# Patient Record
Sex: FEMALE | Race: WHITE | NOT HISPANIC OR LATINO | Employment: FULL TIME | URBAN - METROPOLITAN AREA
[De-identification: names, ages, dates, MRNs, and addresses within clinical notes are randomized per-mention and may not be internally consistent; named-entity substitution may affect disease eponyms.]

---

## 2019-07-17 ENCOUNTER — OFFICE VISIT (OUTPATIENT)
Dept: UROLOGY | Facility: CLINIC | Age: 59
End: 2019-07-17
Payer: COMMERCIAL

## 2019-07-17 VITALS — HEIGHT: 67 IN | BODY MASS INDEX: 30.61 KG/M2 | WEIGHT: 195 LBS | OXYGEN SATURATION: 96 % | HEART RATE: 80 BPM

## 2019-07-17 DIAGNOSIS — R31.29 MICROHEMATURIA: Primary | ICD-10-CM

## 2019-07-17 LAB
ALBUMIN UR-MCNC: NEGATIVE MG/DL
APPEARANCE UR: CLEAR
BILIRUB UR QL STRIP: NEGATIVE
COLOR UR AUTO: YELLOW
GLUCOSE UR STRIP-MCNC: NEGATIVE MG/DL
HGB UR QL STRIP: ABNORMAL
KETONES UR STRIP-MCNC: NEGATIVE MG/DL
LEUKOCYTE ESTERASE UR QL STRIP: ABNORMAL
NITRATE UR QL: NEGATIVE
PH UR STRIP: 7 PH (ref 5–7)
SOURCE: ABNORMAL
SP GR UR STRIP: 1.01 (ref 1–1.03)
UROBILINOGEN UR STRIP-ACNC: 0.2 EU/DL (ref 0.2–1)

## 2019-07-17 PROCEDURE — 88112 CYTOPATH CELL ENHANCE TECH: CPT | Performed by: PHYSICIAN ASSISTANT

## 2019-07-17 PROCEDURE — 87086 URINE CULTURE/COLONY COUNT: CPT | Performed by: PHYSICIAN ASSISTANT

## 2019-07-17 PROCEDURE — 99243 OFF/OP CNSLTJ NEW/EST LOW 30: CPT | Performed by: PHYSICIAN ASSISTANT

## 2019-07-17 PROCEDURE — 87186 SC STD MICRODIL/AGAR DIL: CPT | Performed by: PHYSICIAN ASSISTANT

## 2019-07-17 PROCEDURE — 81003 URINALYSIS AUTO W/O SCOPE: CPT | Mod: QW | Performed by: PHYSICIAN ASSISTANT

## 2019-07-17 PROCEDURE — 87088 URINE BACTERIA CULTURE: CPT | Performed by: PHYSICIAN ASSISTANT

## 2019-07-17 RX ORDER — INFLIXIMAB 100 MG/10ML
INJECTION, POWDER, LYOPHILIZED, FOR SOLUTION INTRAVENOUS ONCE
COMMUNITY
End: 2021-07-16

## 2019-07-17 RX ORDER — IBUPROFEN 200 MG
400 TABLET ORAL PRN
COMMUNITY
Start: 2018-02-19

## 2019-07-17 RX ORDER — PREDNISONE 5 MG/1
5 TABLET ORAL DAILY
Refills: 0 | COMMUNITY
Start: 2019-07-08 | End: 2021-07-16

## 2019-07-17 RX ORDER — MERCAPTOPURINE 50 MG/1
50 TABLET ORAL 2 TIMES DAILY
COMMUNITY
Start: 2019-07-16

## 2019-07-17 ASSESSMENT — PAIN SCALES - GENERAL: PAINLEVEL: NO PAIN (0)

## 2019-07-17 ASSESSMENT — MIFFLIN-ST. JEOR: SCORE: 1487.14

## 2019-07-17 NOTE — NURSING NOTE
Pt stated infusion 2 fridays ago.  Pt denies gross hem.  Pt PCP is chuy draper at [facility].  Some [facility] recs in care everywhere.  GABINO Bergman CMA

## 2019-07-17 NOTE — LETTER
2019       RE: Sandy Childers  [address] [id number]     Dear Colleague,    Thank you for referring your patient, Sandy Childers, to the [facility] [place] [facility]. Please see a copy of my visit note below.    CC: Hematuria.    HPI: It is a pleasure to see Ms. Sandy Childers, a 59 year old female, asked to be seen in consultation by Dr. Mikki Lee for evaluation of micro hematuria. Two UAs in  noted 3-5 RBCs/HPF.  Went away for a few years and now noted at annual exam (11-25 RBCs/HPF).  Saw a different urologist 5+ years ago when there was 3-5 RBCs/HPF and does not recall much of the evaluation.     The patient denies any gross hematuria.  Ms. Childers voids without difficulty, and reports nocturia of 0-1.  She currently denies any dysuria, pyuria, hesitancy, intermittency, feelings of incomplete emptying, or any recent history of urinary tract infections or stones.  Some urgency and leakage at times.     Ulcerative colitis.    Hematuria Risk Factors:  Age >40: Yes     Smoking history: never  Occupational exposure to chemicals or dyes (ie, benzenes, aromatic amines): no  History of urologic disorder or disease: no  History of irritative voiding symptoms: no  History of urinary tract infection: no  Analgesic abuse: no  History of pelvic irradiation: no    PMH:  UC  Hearing loss    SURG:     Hip arthroscopy with labral repair    SOC: Life long nonsmoker.    Current Outpatient Medications   Medication Sig Dispense Refill     ibuprofen (ADVIL) 200 MG tablet Take 400 mg by mouth as needed       inFLIXimab (REMICADE) 100 MG injection once       mercaptopurine (PURINETHOL) 50 MG tablet CHEMO Take 50 mg by mouth 2 times daily       predniSONE (DELTASONE) 5 MG tablet Take 5 mg by mouth daily  0     Allergies   Allergen Reactions     Sulfa Drugs Rash     And fever     FAMILY HISTORY: Father had prostate cancer.   "There is no history of urolithiasis.      ROS: A comprehensive 14 point ROS was obtained and negative except for that outlined above in the HPI.    PHYSICAL EXAM:   Pulse 80   Ht 1.702 m (5' 7\")   Wt 88.5 kg (195 lb)   SpO2 96%   BMI 30.54 kg/m       GEN: NAD  EYES: EOMI  MOUTH: MMM  NECK: Supple  RESP: Unlabored breathing  CARDIAC: No LE edema  SKIN: Warm  ABD: soft  NEURO: AAO    IMAGING: none    ASSESSMENT and PLAN:    59 year old female with micro hematuria.  The differential diagnosis at this point includes stone disease, infection, vaginal contaminant, urothelial malignancy, renal disorder versus another yet unknown diagnosis.    At this time, recommend proceeding with comprehensive hematuria evaluation to include:  - Urinalysis and micro.   - Urine cytology to look for cells concerning for malignancy.  - CT urogram for upper tract imaging.  - Cystoscopy with the first available urologist to evaluate the interior of the bladder. Follow up for hematuria as recommended by urologist performing cystoscopic evaluation.      -Discussed a trial of estrogen cream for urgency. She will think about this.     Thank you for allowing me to participate in Ms. Childers's care. I will keep you updated of her progress, but please do not hesitate to contact me with any questions.    Malika Villanueva PA-C  [facility]  30 minutes were spent with the patient today, > 50% in counseling and coordination of care of hematuria.          "

## 2019-07-17 NOTE — PROGRESS NOTES
"CC: Hematuria.    HPI: It is a pleasure to see Ms. Sandy Childers, a 59 year old female, asked to be seen in consultation by Dr. Mikki Lee for evaluation of micro hematuria. Two UAs in  noted 3-5 RBCs/HPF.  Went away for a few years and now noted at annual exam (11-25 RBCs/HPF).  Saw a different urologist 5+ years ago when there was 3-5 RBCs/HPF and does not recall much of the evaluation.     The patient denies any gross hematuria.  Ms. Childers voids without difficulty, and reports nocturia of 0-1.  She currently denies any dysuria, pyuria, hesitancy, intermittency, feelings of incomplete emptying, or any recent history of urinary tract infections or stones.  Some urgency and leakage at times.     Ulcerative colitis.    Hematuria Risk Factors:  Age >40: Yes     Smoking history: never  Occupational exposure to chemicals or dyes (ie, benzenes, aromatic amines): no  History of urologic disorder or disease: no  History of irritative voiding symptoms: no  History of urinary tract infection: no  Analgesic abuse: no  History of pelvic irradiation: no    PMH:  UC  Hearing loss    SURG:     Hip arthroscopy with labral repair    SOC: Life long nonsmoker.    Current Outpatient Medications   Medication Sig Dispense Refill     ibuprofen (ADVIL) 200 MG tablet Take 400 mg by mouth as needed       inFLIXimab (REMICADE) 100 MG injection once       mercaptopurine (PURINETHOL) 50 MG tablet CHEMO Take 50 mg by mouth 2 times daily       predniSONE (DELTASONE) 5 MG tablet Take 5 mg by mouth daily  0     Allergies   Allergen Reactions     Sulfa Drugs Rash     And fever     FAMILY HISTORY: Father had prostate cancer.  There is no history of urolithiasis.      ROS: A comprehensive 14 point ROS was obtained and negative except for that outlined above in the HPI.    PHYSICAL EXAM:   Pulse 80   Ht 1.702 m (5' 7\")   Wt 88.5 kg (195 lb)   SpO2 96%   BMI 30.54 kg/m      GEN: NAD  EYES: EOMI  MOUTH: MMM  NECK: Supple  RESP: " Unlabored breathing  CARDIAC: No LE edema  SKIN: Warm  ABD: soft  NEURO: AAO    IMAGING: none    ASSESSMENT and PLAN:    59 year old female with micro hematuria.  The differential diagnosis at this point includes stone disease, infection, vaginal contaminant, urothelial malignancy, renal disorder versus another yet unknown diagnosis.    At this time, recommend proceeding with comprehensive hematuria evaluation to include:  - Urinalysis and micro.   - Urine cytology to look for cells concerning for malignancy.  - CT urogram for upper tract imaging.  - Cystoscopy with the first available urologist to evaluate the interior of the bladder. Follow up for hematuria as recommended by urologist performing cystoscopic evaluation.      -Discussed a trial of estrogen cream for urgency. She will think about this.     Thank you for allowing me to participate in Ms. Childers's care. I will keep you updated of her progress, but please do not hesitate to contact me with any questions.    Malika Villanueva PA-C  [facility]  30 minutes were spent with the patient today, > 50% in counseling and coordination of care of hematuria.

## 2019-07-17 NOTE — PATIENT INSTRUCTIONS
-Urinalysis and micro analysis.   -Urine culture  - Urine cytology to look for abnormal cells.  - CT scan  - Cystoscopy with the  urologist to evaluate the interior of the bladder. Follow up as recommended by the urologist.

## 2019-07-19 DIAGNOSIS — N39.0 URINARY TRACT INFECTION: Primary | ICD-10-CM

## 2019-07-19 LAB
BACTERIA SPEC CULT: ABNORMAL
COPATH REPORT: NORMAL
Lab: ABNORMAL
SPECIMEN SOURCE: ABNORMAL

## 2019-07-19 RX ORDER — CIPROFLOXACIN 500 MG/1
500 TABLET, FILM COATED ORAL 2 TIMES DAILY
Qty: 14 TABLET | Refills: 0 | Status: SHIPPED | OUTPATIENT
Start: 2019-07-19 | End: 2019-07-26

## 2019-07-19 NOTE — RESULT ENCOUNTER NOTE
Called patient and informed her or results. ABT sent to patient's preferred pharmacy. Sarah Izaguirre LPN

## 2019-08-15 ENCOUNTER — HOSPITAL ENCOUNTER (OUTPATIENT)
Dept: CT IMAGING | Facility: CLINIC | Age: 59
Discharge: HOME | End: 2019-08-15
Attending: PHYSICIAN ASSISTANT | Admitting: PHYSICIAN ASSISTANT
Payer: COMMERCIAL

## 2019-08-15 ENCOUNTER — OFFICE VISIT (OUTPATIENT)
Dept: UROLOGY | Facility: CLINIC | Age: 59
End: 2019-08-15
Payer: COMMERCIAL

## 2019-08-15 VITALS — BODY MASS INDEX: 30.61 KG/M2 | OXYGEN SATURATION: 96 % | HEART RATE: 86 BPM | HEIGHT: 67 IN | WEIGHT: 195 LBS

## 2019-08-15 DIAGNOSIS — R31.29 MICROHEMATURIA: Primary | ICD-10-CM

## 2019-08-15 DIAGNOSIS — R31.29 MICROHEMATURIA: ICD-10-CM

## 2019-08-15 LAB
ALBUMIN UR-MCNC: NEGATIVE MG/DL
APPEARANCE UR: CLEAR
BILIRUB UR QL STRIP: NEGATIVE
COLOR UR AUTO: YELLOW
GLUCOSE UR STRIP-MCNC: NEGATIVE MG/DL
HGB UR QL STRIP: ABNORMAL
KETONES UR STRIP-MCNC: NEGATIVE MG/DL
LEUKOCYTE ESTERASE UR QL STRIP: NEGATIVE
NITRATE UR QL: NEGATIVE
PH UR STRIP: 7 PH (ref 5–7)
SOURCE: ABNORMAL
SP GR UR STRIP: 1.01 (ref 1–1.03)
UROBILINOGEN UR STRIP-ACNC: 0.2 EU/DL (ref 0.2–1)

## 2019-08-15 PROCEDURE — 25000125 ZZHC RX 250: Performed by: RADIOLOGY

## 2019-08-15 PROCEDURE — 25000128 H RX IP 250 OP 636: Performed by: PHYSICIAN ASSISTANT

## 2019-08-15 PROCEDURE — 74178 CT ABD&PLV WO CNTR FLWD CNTR: CPT

## 2019-08-15 PROCEDURE — 99213 OFFICE O/P EST LOW 20 MIN: CPT | Mod: 25 | Performed by: UROLOGY

## 2019-08-15 PROCEDURE — 81003 URINALYSIS AUTO W/O SCOPE: CPT | Performed by: UROLOGY

## 2019-08-15 PROCEDURE — 52000 CYSTOURETHROSCOPY: CPT | Performed by: UROLOGY

## 2019-08-15 RX ORDER — IOPAMIDOL 755 MG/ML
500 INJECTION, SOLUTION INTRAVASCULAR ONCE
Status: DISCONTINUED | OUTPATIENT
Start: 2019-08-15 | End: 2019-08-15 | Stop reason: CLARIF

## 2019-08-15 RX ORDER — LIDOCAINE HYDROCHLORIDE 20 MG/ML
JELLY TOPICAL ONCE
Status: DISCONTINUED | OUTPATIENT
Start: 2019-08-15 | End: 2021-07-16

## 2019-08-15 RX ORDER — IOPAMIDOL 755 MG/ML
100 INJECTION, SOLUTION INTRAVASCULAR ONCE
Status: COMPLETED | OUTPATIENT
Start: 2019-08-15 | End: 2019-08-15

## 2019-08-15 RX ADMIN — IOPAMIDOL 100 ML: 755 INJECTION, SOLUTION INTRAVENOUS at 14:52

## 2019-08-15 RX ADMIN — SODIUM CHLORIDE 65 ML: 9 INJECTION, SOLUTION INTRAVENOUS at 14:52

## 2019-08-15 ASSESSMENT — PAIN SCALES - GENERAL: PAINLEVEL: NO PAIN (0)

## 2019-08-15 ASSESSMENT — MIFFLIN-ST. JEOR: SCORE: 1487.14

## 2019-08-15 NOTE — NURSING NOTE
Pt has swollen ankles.  Pt is taking drainage tone.  Prior to the start of the procedure and with procedural staff participation, I verbally confirmed the patient s identity using two indicators, relevant allergies, that the procedure was appropriate and matched the consent or emergent situation, and that the correct equipment/implants were available. Immediately prior to starting the procedure I conducted the Time Out with the procedural staff and re-confirmed the patient s name, procedure, and site/side. (The Joint Commission universal protocol was followed.)  Yes    Sedation (Moderate or Deep): None  Pt has signed the consent form stating that we will be doing a CYSTOSCOPY (with or without stent removal) today, and that it is the correct procedure. I verbally confirmed the patient s identity using two indicators, relevant allergies, and that the correct equipment was available. Post-op information given to the pt as needed at check-out. I have sent an appropriate antibiotic to the pharmacy in our building as recommended by the MD. GABINO Bergman CMA    5mL 2% lidocaine hydrochloride Urojet instilled into urethra.    NDC# [address]-23  Lot #: [id number]  Expiration Date:  3-21  GABINO Bergman CMA

## 2019-08-15 NOTE — PATIENT INSTRUCTIONS
"AFTER YOUR CYSTOSCOPY  ?  ?  You have just completed a cystoscopy, or \"cysto\", which allowed your physician to learn more about your bladder (or to remove a stent placed after surgery). We suggest that you continue to avoid caffeine, fruit juice, and alcohol for the next 24 hours, however, you are encouraged to return to your normal activities.  ?  ?  A few things that are considered normal after your cystoscopy:  ?  * small amount of bleeding (or spotting) that clears within the next 24 hours  ?  * slight burning sensation with urination  ?  * sensation of needing to void (urinate) more frequently  ?  * the feeling of \"air\" in your urine  ?  * mild discomfort that is relieved with Tylenol    * bladder spasms  ?  ?  ?  Please contact our office promptly if you:  ?  * develop a fever above 101 degrees  ?  * are unable to urinate  ?  * develop bright red blood that does not stop  ?  * experience severe pain or swelling  ?  ?  ?  And of course, please contact our office with any concerns or questions [phone]  ?      AFTER YOUR CYSTOSCOPY        You have just completed a cystoscopy, or \"cysto\", which allowed your physician to learn more about your bladder (or to remove a stent placed after surgery). We suggest that you continue to avoid caffeine, fruit juice, and alcohol for the next 24 hours, however, you are encouraged to return to your normal activities.         A few things that are considered normal after your cystoscopy:     * Small amount of bleeding (or spotting) that clears within the next 24 hours     * Slight burning sensation with urination     * Sensation to of needing to avoid more frequently     * The feeling of \"air\" in your urine     * Mild discomfort that is relieved with Tylenol        Please contact our office promptly if you:     * Develop a fever above 101 degrees     * Are unable to urinate     * Develop bright red blood that does not stop     * Severe pain or swelling         Please contact " our office with any concerns or questions @[facility].

## 2019-08-15 NOTE — LETTER
"8/15/2019      RE: Sandy Childers  [address] [facility] [address]         CHIEF COMPLAINT   It was my pleasure to see Sandy Childers who is a 59 year old female for follow-up of microscopic hematuria.    HPI   Sandy Childers is a very pleasant 59 year old female who presents with a history of microscopic hematuria. She has had this for some time and reportedly had negative workup more than 5 years ago. Recently had recurrence of asymptomatic microscopic hematuria. Cytology performed in clinic and negative. No gross hematuria.    CT Urogram 8/15/2019  IMPRESSION:   1. No CT findings to explain patient's symptoms of microscopic  hematuria, namely no radiodense kidney or ureteric stones. No  hydronephrosis in either kidney.  2. Significant hepatic steatosis.  3. Small hiatal hernia.    PHYSICAL EXAM  Patient is a 59 year old  female   Vitals: Pulse 86, height 1.702 m (5' 7\"), weight 88.5 kg (195 lb), SpO2 96 %.  General Appearance Adult: Body mass index is 30.54 kg/m .  Alert, no acute distress, oriented  HENT: throat/mouth:normal, good dentition  Lungs: no respiratory distress, or pursed lip breathing  Heart: No obvious jugular venous distension present  Abdomen: soft, nontender, no organomegaly or masses  Back: no CVAT  Musculoskeltal: extremities normal, no peripheral edema  Skin: no suspicious lesions or rashes  Neuro: Alert, oriented, speech and mentation normal  Psych: affect and mood normal  Gait: Normal    Cystoscopy Procedure 8/15/2019  Pre-procedure diagnosis:  Microscopic Hematuria  Post-procedure diagnosis: Normal cystoscopy  Procedure performed:  Cystourethroscopy  Surgeon:    Ed Morocho MD   Anesthesia:    Local    Description of procedure:   After fully informed, voluntary consent was obtained, the patient was brought into the procedure room, identified and placed in a dorsal lithotomy position on the cystoscopy table.  The vagina/introitus were prepped with betadine and draped in a sterile " fashion. Urojet lidocaine gel was introduced.  A 15F flexible cystoscope was inserted into the urethra, and the bladder and urethra were examined in a systematic manner.  The patient tolerated the procedure well and there were no complications.      Findings:  External exam revealed no cystocele and no rectocele.   Cystoscopy then showed the urethra to be normal in appearance with normal coaptation. The bladder itself was completely surveyed.  The ureteric orifices were normal in position and number and effluxing clear urine.  There was no trabeculation.  There were no neoplasms, stones, or diverticula identifed.      ASSESSMENT and PLAN  59 year old female with asymptomatic microscopic hematuria.   Upper tract Imaging is normal   Cystoscopy is normal  Cytology was not concerning for malignancy    The hematuria work up revealed no source of the hematuria.  Therefore according to AUA Best Practice Policy Recommendations, we would recommend the patient follow up with her primary care physician for annual UAs.  If these demonstrate no hematuria over the next two years, there is no need for further follow up.  If the hematuria is persistent only on a microscopic level, she should return for urologic evaluation in 3-5 years per AUA recommendations.  If gross hematuria is experienced, she should return for evaluation sooner.    If there is hypertension, or proteinuria, casts etc in addition to the hematuria, recommend a nephrology evaluation.    If gross hematuria returns in the absence of primary renal disease, would repeat the hematuria workup in 24 months.    I spent over 15 minutes with the patient.  Over half this time was spent on counseling regarding microscopic hematuria.    Ed Morocho MD  Urology  [facility]  Clinic Phone [phone]

## 2019-08-15 NOTE — PROGRESS NOTES
"  CHIEF COMPLAINT   It was my pleasure to see Sandy Childers who is a 59 year old female for follow-up of microscopic hematuria.    HPI   Sandy Childers is a very pleasant 59 year old female who presents with a history of microscopic hematuria. She has had this for some time and reportedly had negative workup more than 5 years ago. Recently had recurrence of asymptomatic microscopic hematuria. Cytology performed in clinic and negative. No gross hematuria.    CT Urogram 8/15/2019  IMPRESSION:   1. No CT findings to explain patient's symptoms of microscopic  hematuria, namely no radiodense kidney or ureteric stones. No  hydronephrosis in either kidney.  2. Significant hepatic steatosis.  3. Small hiatal hernia.    PHYSICAL EXAM  Patient is a 59 year old  female   Vitals: Pulse 86, height 1.702 m (5' 7\"), weight 88.5 kg (195 lb), SpO2 96 %.  General Appearance Adult: Body mass index is 30.54 kg/m .  Alert, no acute distress, oriented  HENT: throat/mouth:normal, good dentition  Lungs: no respiratory distress, or pursed lip breathing  Heart: No obvious jugular venous distension present  Abdomen: soft, nontender, no organomegaly or masses  Back: no CVAT  Musculoskeltal: extremities normal, no peripheral edema  Skin: no suspicious lesions or rashes  Neuro: Alert, oriented, speech and mentation normal  Psych: affect and mood normal  Gait: Normal    Cystoscopy Procedure 8/15/2019  Pre-procedure diagnosis:  Microscopic Hematuria  Post-procedure diagnosis: Normal cystoscopy  Procedure performed:  Cystourethroscopy  Surgeon:    Ed Morocho MD   Anesthesia:    Local    Description of procedure:   After fully informed, voluntary consent was obtained, the patient was brought into the procedure room, identified and placed in a dorsal lithotomy position on the cystoscopy table.  The vagina/introitus were prepped with betadine and draped in a sterile fashion. Urojet lidocaine gel was introduced.  A 15F flexible cystoscope was inserted " into the urethra, and the bladder and urethra were examined in a systematic manner.  The patient tolerated the procedure well and there were no complications.      Findings:  External exam revealed no cystocele and no rectocele.   Cystoscopy then showed the urethra to be normal in appearance with normal coaptation. The bladder itself was completely surveyed.  The ureteric orifices were normal in position and number and effluxing clear urine.  There was no trabeculation.  There were no neoplasms, stones, or diverticula identifed.      ASSESSMENT and PLAN  59 year old female with asymptomatic microscopic hematuria.   Upper tract Imaging is normal   Cystoscopy is normal  Cytology was not concerning for malignancy    The hematuria work up revealed no source of the hematuria.  Therefore according to AUA Best Practice Policy Recommendations, we would recommend the patient follow up with her primary care physician for annual UAs.  If these demonstrate no hematuria over the next two years, there is no need for further follow up.  If the hematuria is persistent only on a microscopic level, she should return for urologic evaluation in 3-5 years per AUA recommendations.  If gross hematuria is experienced, she should return for evaluation sooner.    If there is hypertension, or proteinuria, casts etc in addition to the hematuria, recommend a nephrology evaluation.    If gross hematuria returns in the absence of primary renal disease, would repeat the hematuria workup in 24 months.    I spent over 15 minutes with the patient.  Over half this time was spent on counseling regarding microscopic hematuria.    Ed Morocho MD  Urology  [facility]  Clinic Phone [phone]

## 2020-02-12 ENCOUNTER — ANCILLARY PROCEDURE (OUTPATIENT)
Dept: GENERAL RADIOLOGY | Facility: CLINIC | Age: 60
End: 2020-02-12
Attending: PHYSICIAN ASSISTANT
Payer: COMMERCIAL

## 2020-02-12 ENCOUNTER — OFFICE VISIT (OUTPATIENT)
Dept: URGENT CARE | Facility: URGENT CARE | Age: 60
End: 2020-02-12
Payer: COMMERCIAL

## 2020-02-12 VITALS
TEMPERATURE: 99.6 F | HEART RATE: 91 BPM | SYSTOLIC BLOOD PRESSURE: 104 MMHG | DIASTOLIC BLOOD PRESSURE: 58 MMHG | BODY MASS INDEX: 30.23 KG/M2 | RESPIRATION RATE: 16 BRPM | OXYGEN SATURATION: 95 % | WEIGHT: 193 LBS

## 2020-02-12 DIAGNOSIS — J10.1 INFLUENZA A: ICD-10-CM

## 2020-02-12 DIAGNOSIS — J10.1 INFLUENZA A: Primary | ICD-10-CM

## 2020-02-12 PROCEDURE — 99214 OFFICE O/P EST MOD 30 MIN: CPT | Performed by: PHYSICIAN ASSISTANT

## 2020-02-12 PROCEDURE — 71046 X-RAY EXAM CHEST 2 VIEWS: CPT

## 2020-02-12 RX ORDER — AZITHROMYCIN 250 MG/1
TABLET, FILM COATED ORAL
Qty: 6 TABLET | Refills: 0 | Status: SHIPPED | OUTPATIENT
Start: 2020-02-12 | End: 2021-07-16

## 2020-02-12 NOTE — PATIENT INSTRUCTIONS
Patient Education     Influenza (Adult)    Influenza is also called the flu. It is a viral illness that affects the air passages of your lungs. It is different from the common cold. The flu can easily be passed from one to person to another. It may be spread through the air by coughing and sneezing. Or it can be spread by touching the sick person and then touching your own eyes, nose, or mouth.  The flu starts 1 to 3 days after you are exposed to the flu virus. It may last for 1 to 2 weeks but many people feel tired or fatigued for many weeks afterward. You usually don t need to take antibiotics unless you have a complication. This might be an ear or sinus infection or pneumonia.  Symptoms of the flu may be mild or severe. They can include extreme tiredness (wanting to stay in bed all day), chills, fevers, muscle aches, soreness with eye movement, headache, and a dry, hacking cough.  Home care  Follow these guidelines when caring for yourself at home:    Avoid being around cigarette smoke, whether yours or other people s.    Acetaminophen or ibuprofen will help ease your fever, muscle aches, and headache. Don t give aspirin to anyone younger than 18 who has the flu. Aspirin can harm the liver.    Nausea and loss of appetite are common with the flu. Eat light meals. Drink 6 to 8 glasses of liquids every day. Good choices are water, sport drinks, soft drinks without caffeine, juices, tea, and soup. Extra fluids will also help loosen secretions in your nose and lungs.    Over-the-counter cold medicines will not make the flu go away faster. But the medicines may help with coughing, sore throat, and congestion in your nose and sinuses. Don t use a decongestant if you have high blood pressure.    Stay home until your fever has been gone for at least 24 hours without using medicine to reduce fever.  Follow-up care  Follow up with your healthcare provider, or as advised, if you are not getting better over the next  week.  If you are age 65 or older, talk with your provider about getting a pneumococcal vaccine every 5 years. You should also get this vaccine if you have chronic asthma or COPD. All adults should get a flu vaccine every fall. Ask your provider about this.  When to seek medical advice  Call your healthcare provider right away if any of these occur:    Cough with lots of colored mucus (sputum) or blood in your mucus    Chest pain, shortness of breath, wheezing, or trouble breathing    Severe headache, or face, neck, or ear pain    New rash with fever    Fever of 100.4 F (38 C) or higher, or as directed by your healthcare provider    Confusion, behavior change, or seizure    Severe weakness or dizziness    You get a new fever or cough after getting better for a few days  Date Last Reviewed: 1/1/2017 2000-2019 [employer]. [address] [place] [address], [place] [address]. All rights reserved. This information is not intended as a substitute for professional medical care. Always follow your healthcare professional's instructions.

## 2020-02-12 NOTE — PROGRESS NOTES
SUBJECTIVE:   Sandy Childers is a 59 year old female presenting with a chief complaint of fever, cough. Positive flu A.  Too late to treat.    Onset of symptoms was 5 day(s) ago.  Course of illness is same.    Severity moderate  Current and Associated symptoms: as above  Treatment measures tried include Tylenol/Ibuprofen, quils, muicinex, cough drops, vicks.  Predisposing factors include None.    Past Medical History:   Diagnosis Date     Mumps      Spider veins      Current Outpatient Medications   Medication Sig Dispense Refill     DM-Doxylamine-Acetaminophen (NYQUIL COLD & FLU PO)        ibuprofen (ADVIL) 200 MG tablet Take 400 mg by mouth as needed       inFLIXimab (REMICADE) 100 MG injection once       Lifitegrast (XIIDRA OP)        mercaptopurine (PURINETHOL) 50 MG tablet CHEMO Take 50 mg by mouth 2 times daily       Pseudoephedrine-DM-GG-APAP (DAYQUIL LIQUICAPS OR)        predniSONE (DELTASONE) 5 MG tablet Take 5 mg by mouth daily  0     WHEAT GERM OIL PO        Social History     Tobacco Use     Smoking status: Never Smoker     Smokeless tobacco: Never Used   Substance Use Topics     Alcohol use: Not on file     Family history noncontributory       Allergies   Allergen Reactions     Sulfa Drugs Rash     And fever         ROS:  10 point ROS negative except as listed above      OBJECTIVE:  /58   Pulse 91   Temp 99.6  F (37.6  C) (Tympanic)   Resp 16   Wt 87.5 kg (193 lb)   SpO2 95%   BMI 30.23 kg/m    GENERAL APPEARANCE: healthy, alert and no distress  EYES: EOMI,  PERRL, conjunctiva clear  HENT: ear canals and TM's normal.  Nose and mouth without ulcers, erythema or lesions  NECK: supple, nontender, no lymphadenopathy  RESP: lungs clear to auscultation - no rales, rhonchi or wheezes  CV: regular rates and rhythm, normal S1 S2, no murmur noted  NEURO: Normal strength and tone, sensory exam grossly normal,  normal speech and mentation  SKIN: no suspicious lesions or rashes    X-ray image initially  interpreted in clinic by me in order to rule out pneumonia.  No evidence appreciated.      ASSESSMENT:  (J10.1) Influenza A  (primary encounter diagnosis)  Comment: covering for secondary infection  Plan: XR Chest 2 Views, albuterol (PROAIR RESPICLICK)        108 (90 Base) MCG/ACT inhaler, azithromycin         (ZITHROMAX) 250 MG tablet        Red flags and emergent follow up discussed, and understood by patient  Follow up with PCP if symptoms worsen or fail to improve      Patient Instructions     Patient Education     Influenza (Adult)    Influenza is also called the flu. It is a viral illness that affects the air passages of your lungs. It is different from the common cold. The flu can easily be passed from one to person to another. It may be spread through the air by coughing and sneezing. Or it can be spread by touching the sick person and then touching your own eyes, nose, or mouth.  The flu starts 1 to 3 days after you are exposed to the flu virus. It may last for 1 to 2 weeks but many people feel tired or fatigued for many weeks afterward. You usually don t need to take antibiotics unless you have a complication. This might be an ear or sinus infection or pneumonia.  Symptoms of the flu may be mild or severe. They can include extreme tiredness (wanting to stay in bed all day), chills, fevers, muscle aches, soreness with eye movement, headache, and a dry, hacking cough.  Home care  Follow these guidelines when caring for yourself at home:    Avoid being around cigarette smoke, whether yours or other people s.    Acetaminophen or ibuprofen will help ease your fever, muscle aches, and headache. Don t give aspirin to anyone younger than 18 who has the flu. Aspirin can harm the liver.    Nausea and loss of appetite are common with the flu. Eat light meals. Drink 6 to 8 glasses of liquids every day. Good choices are water, sport drinks, soft drinks without caffeine, juices, tea, and soup. Extra fluids will also  help loosen secretions in your nose and lungs.    Over-the-counter cold medicines will not make the flu go away faster. But the medicines may help with coughing, sore throat, and congestion in your nose and sinuses. Don t use a decongestant if you have high blood pressure.    Stay home until your fever has been gone for at least 24 hours without using medicine to reduce fever.  Follow-up care  Follow up with your healthcare provider, or as advised, if you are not getting better over the next week.  If you are age 65 or older, talk with your provider about getting a pneumococcal vaccine every 5 years. You should also get this vaccine if you have chronic asthma or COPD. All adults should get a flu vaccine every fall. Ask your provider about this.  When to seek medical advice  Call your healthcare provider right away if any of these occur:    Cough with lots of colored mucus (sputum) or blood in your mucus    Chest pain, shortness of breath, wheezing, or trouble breathing    Severe headache, or face, neck, or ear pain    New rash with fever    Fever of 100.4 F (38 C) or higher, or as directed by your healthcare provider    Confusion, behavior change, or seizure    Severe weakness or dizziness    You get a new fever or cough after getting better for a few days  Date Last Reviewed: 1/1/2017 2000-2019 [employer]. [address] [place] [address], [place] [address]. All rights reserved. This information is not intended as a substitute for professional medical care. Always follow your healthcare professional's instructions.

## 2021-07-16 ENCOUNTER — OFFICE VISIT (OUTPATIENT)
Dept: PEDIATRICS | Facility: CLINIC | Age: 61
End: 2021-07-16
Payer: COMMERCIAL

## 2021-07-16 ENCOUNTER — TELEPHONE (OUTPATIENT)
Dept: UROLOGY | Facility: CLINIC | Age: 61
End: 2021-07-16

## 2021-07-16 VITALS
WEIGHT: 199.7 LBS | RESPIRATION RATE: 14 BRPM | DIASTOLIC BLOOD PRESSURE: 64 MMHG | TEMPERATURE: 98 F | OXYGEN SATURATION: 98 % | BODY MASS INDEX: 31.34 KG/M2 | SYSTOLIC BLOOD PRESSURE: 118 MMHG | HEIGHT: 67 IN | HEART RATE: 84 BPM

## 2021-07-16 DIAGNOSIS — N89.8 VAGINAL ODOR: ICD-10-CM

## 2021-07-16 DIAGNOSIS — R39.9 UTI SYMPTOMS: Primary | ICD-10-CM

## 2021-07-16 DIAGNOSIS — Z87.440 PERSONAL HISTORY OF URINARY TRACT INFECTION: ICD-10-CM

## 2021-07-16 DIAGNOSIS — Z87.448 HISTORY OF HEMATURIA: ICD-10-CM

## 2021-07-16 PROBLEM — K51.90 ULCERATIVE COLITIS (H): Status: ACTIVE | Noted: 2018-01-01

## 2021-07-16 LAB
ALBUMIN UR-MCNC: NEGATIVE MG/DL
APPEARANCE UR: CLEAR
BACTERIA #/AREA URNS HPF: ABNORMAL /HPF
BILIRUB UR QL STRIP: NEGATIVE
CLUE CELLS: ABNORMAL
COLOR UR AUTO: YELLOW
GLUCOSE UR STRIP-MCNC: NEGATIVE MG/DL
HGB UR QL STRIP: ABNORMAL
KETONES UR STRIP-MCNC: NEGATIVE MG/DL
LEUKOCYTE ESTERASE UR QL STRIP: NEGATIVE
NITRATE UR QL: NEGATIVE
PH UR STRIP: 6 [PH] (ref 5–7)
RBC #/AREA URNS AUTO: ABNORMAL /HPF
SP GR UR STRIP: 1.02 (ref 1–1.03)
SQUAMOUS #/AREA URNS AUTO: ABNORMAL /LPF
TRICHOMONAS, WET PREP: ABNORMAL
UROBILINOGEN UR STRIP-ACNC: 0.2 E.U./DL
WBC #/AREA URNS AUTO: ABNORMAL /HPF
WBC'S/HIGH POWER FIELD, WET PREP: ABNORMAL
YEAST, WET PREP: ABNORMAL

## 2021-07-16 PROCEDURE — 87086 URINE CULTURE/COLONY COUNT: CPT | Performed by: NURSE PRACTITIONER

## 2021-07-16 PROCEDURE — 81001 URINALYSIS AUTO W/SCOPE: CPT | Performed by: NURSE PRACTITIONER

## 2021-07-16 PROCEDURE — 87210 SMEAR WET MOUNT SALINE/INK: CPT | Performed by: NURSE PRACTITIONER

## 2021-07-16 PROCEDURE — 99213 OFFICE O/P EST LOW 20 MIN: CPT | Performed by: NURSE PRACTITIONER

## 2021-07-16 ASSESSMENT — MIFFLIN-ST. JEOR: SCORE: 1498.46

## 2021-07-16 NOTE — TELEPHONE ENCOUNTER
[facility] Call Center    Phone Message    May a detailed message be left on voicemail: yes     Reason for Call: Appointment Intake    Referring Provider Name: Snow Bernard, NP    Diagnosis and/or Symptoms: UTI symptoms [R39.9]  Personal history of urinary tract infection [Z87.440]  History of hematuria    Pt has seen Dr. Morocho in the past as well as Malika Villanueva.  I offered an appt. In [place] with both providers for current issues.  Pt declined and would like to go to [place] only.  Please reach out to Pt for scheduling.  Thank you.    Action Taken: Message routed to:  Other: ub uro    Travel Screening: Not Applicable

## 2021-07-16 NOTE — PROGRESS NOTES
"Assessment & Plan     UTI symptoms  Personal history of urinary tract infection  Vaginal odor  History of hematuria  Wet prep negative. UA shows blood, which is an ongoing problem for her (has seen urology in the past for this). Continues to have UTI like symptoms for the past 2 + mos, to see urology for further work-up based on her previous hx of hematuria.  - UA Macro with Reflex to Micro and Culture - lab collect; Future  - Wet prep - Clinic Collect  - UA Macro with Reflex to Micro and Culture - lab collect  - Urine Microscopic  - Urine Culture Aerobic Bacterial - lab collect; Future  - Adult Urology Referral; Future      There are no Patient Instructions on file for this visit.    Return in about 1 month (around 8/16/2021) for Physical Exam.    Snow Bernard NP  [facility] HELEN Delgado is a 61 year old who presents for the following health issues     History of Present Illness       She eats 2-3 servings of fruits and vegetables daily.She consumes 0 sweetened beverage(s) daily.She exercises with enough effort to increase her heart rate 30 to 60 minutes per day.  She exercises with enough effort to increase her heart rate 3 or less days per week.   She is taking medications regularly.       Genitourinary - Female  Was treated for UTI thru [facility] approx 6 weeks ago - after taking antibiotic still had symptoms - went in again and got another antibiotic    Onset/Duration: 6 weeks  Description:   Painful urination (Dysuria): \"burning pinching heaviness\"           Frequency: no  Blood in urine (Hematuria): YES  Delay in urine (Hesitency): no  Intensity: moderate  Progression of Symptoms:  worsening  Accompanying Signs & Symptoms:  Fever/chills: no  Flank pain: YES- lower back pain - but not sure if from this  Nausea and vomiting: YES- with colitis  Vaginal symptoms: odor  Abdominal/Pelvic Pain: no  History:   History of frequent UTI s: not frequent - but hx positive  History of " "kidney stones: no  Sexually Active: not currently  Possibility of pregnancy: No  Precipitating or alleviating factors: None  Therapies tried and outcome: Increase fluid intake and  antibiotics from [facility]     5/12: Macrobid  5/17: Cephalexin  Antibiotics didn't help her symptoms  Symptoms not worsening, but not improving  Some days better than other  Burning/fullness  Vaginal odor    Hx of hematuria, saw urology last 8/15/19 with recommendation for annual UA and follow-up with urology in 3-5 years    Review of Systems   Otherwise ROS is negative except as stated above.        Objective    /64 (BP Location: Right arm, Cuff Size: Adult Large)   Pulse 84   Temp 98  F (36.7  C) (Oral)   Resp 14   Ht 1.702 m (5' 7\")   Wt 90.6 kg (199 lb 11.2 oz)   SpO2 98%   BMI 31.28 kg/m    Body mass index is 31.28 kg/m .  Physical Exam   GENERAL: healthy, alert and no distress    No results found for this or any previous visit (from the past 24 hour(s)).            "

## 2021-07-17 LAB — BACTERIA UR CULT: NORMAL

## 2021-08-11 ENCOUNTER — OFFICE VISIT (OUTPATIENT)
Dept: UROLOGY | Facility: CLINIC | Age: 61
End: 2021-08-11
Payer: COMMERCIAL

## 2021-08-11 VITALS
HEIGHT: 67 IN | BODY MASS INDEX: 30.13 KG/M2 | WEIGHT: 192 LBS | SYSTOLIC BLOOD PRESSURE: 116 MMHG | DIASTOLIC BLOOD PRESSURE: 60 MMHG

## 2021-08-11 DIAGNOSIS — R30.0 DYSURIA: Primary | ICD-10-CM

## 2021-08-11 DIAGNOSIS — R19.00 PELVIC FULLNESS: ICD-10-CM

## 2021-08-11 DIAGNOSIS — N95.2 ATROPHIC VAGINITIS: ICD-10-CM

## 2021-08-11 DIAGNOSIS — R31.29 MICROHEMATURIA: ICD-10-CM

## 2021-08-11 LAB
ALBUMIN UR-MCNC: NEGATIVE MG/DL
APPEARANCE UR: CLEAR
BACTERIA #/AREA URNS HPF: ABNORMAL /HPF
BILIRUB UR QL STRIP: NEGATIVE
COLOR UR AUTO: YELLOW
GLUCOSE UR STRIP-MCNC: NEGATIVE MG/DL
HGB UR QL STRIP: ABNORMAL
KETONES UR STRIP-MCNC: 15 MG/DL
LEUKOCYTE ESTERASE UR QL STRIP: NEGATIVE
NITRATE UR QL: NEGATIVE
PH UR STRIP: 6 [PH] (ref 5–7)
RBC URINE: 1 /HPF
RESIDUAL VOLUME (RV) (EXTERNAL): 79
SP GR UR STRIP: 1.01 (ref 1–1.03)
UROBILINOGEN UR STRIP-ACNC: 0.2 E.U./DL
WBC URINE: 1 /HPF

## 2021-08-11 PROCEDURE — 99213 OFFICE O/P EST LOW 20 MIN: CPT | Mod: 25 | Performed by: PHYSICIAN ASSISTANT

## 2021-08-11 PROCEDURE — 81001 URINALYSIS AUTO W/SCOPE: CPT | Performed by: PHYSICIAN ASSISTANT

## 2021-08-11 PROCEDURE — 51798 US URINE CAPACITY MEASURE: CPT | Performed by: PHYSICIAN ASSISTANT

## 2021-08-11 PROCEDURE — 87109 MYCOPLASMA: CPT | Performed by: PHYSICIAN ASSISTANT

## 2021-08-11 RX ORDER — FLUTICASONE PROPIONATE 50 MCG
1 SPRAY, SUSPENSION (ML) NASAL DAILY
COMMUNITY

## 2021-08-11 RX ORDER — ESTRADIOL 0.1 MG/G
CREAM VAGINAL
Qty: 42.5 G | Refills: 3 | Status: SHIPPED | OUTPATIENT
Start: 2021-08-11

## 2021-08-11 ASSESSMENT — MIFFLIN-ST. JEOR: SCORE: 1463.54

## 2021-08-11 ASSESSMENT — PAIN SCALES - GENERAL: PAINLEVEL: NO PAIN (0)

## 2021-08-11 NOTE — NURSING NOTE
Chief Complaint   Patient presents with     UTI symptoms     Pt. is continuing to experience a pinching/burning, heavy sensation despite recent UTI treatment.    PVR: 79 mL    Brooklyn Dominguez, EMT

## 2021-08-11 NOTE — PATIENT INSTRUCTIONS
Start estrogen cream a pea sized amount by the urethra and vaginal introitus at bedtime three times a week (Monday, Wednesday, and Friday).  If >$40, let me know and we can get via a compound pharmacy.    Sent urine for microscopy, Ureaplasma and Mycoplasma cultures.  If positive cultures, would treat with culture specific antibiotics.  If microscopic blood increased, would consider repeat of CT Urogram and cystoscopy.    If estrogen doesn't help discomfort, would consider possible pelvic floor PT in the future.    Send me a message on [employer] or call in to update with symptoms on estrogen.  Also, I will follow up on your urine results and next recommendations.    Call [phone].

## 2021-08-11 NOTE — LETTER
2021       RE: Sandy Childers  [address]  [place] [address]     Dear Colleague,    Thank you for referring your patient, Sandy Childers, to the [facility] [place] [facility]. Please see a copy of my visit note below.    Subjective      CHIEF COMPLAINT/REASON FOR VISIT   Pelvic discomfort/fullness/pinching    HISTORY OF PRESENT ILLNESS   Ms. Childers is a pleasant 61-year-old, , female who feels a burning/pinching and heavy sensation in the pelvis despite treatment for urinary tract infections.  Patient denies symptoms of prolapse.  She does note that she gets nausea and vomiting with her colitis, but denies these currently.  She was treated for urinary tract infection on May 12 with Macrobid and May 17 with Keflex.  They did not improve her symptomatology.  Patient has been on several antibiotics and she is still had the symptoms for several months.  [facility] infection.     She has been previously seen for microscopic hematuria and had a normal cystoscopy in 2019 with Dr. Birmingham.  Urinalysis have not been convincing for infection.  Patient notes some occasional urge incontinence with rare urgency.  She does note some bowel issues.  She has been evaluated prior to the visit with Dr. Birmingham for microscopic hematuria.    They had previously discussed possible trial of topical estrogen to see if this would help her symptomatology.    She does note that she spends a lot of time in the bathroom due to her colitis.  She has had blood in her stool.    The following portions of the patient's history were reviewed and updated as appropriate: allergies, current medications, past family history, past medical history, past social history, past surgical history, and problem list.     REVIEW OF SYSTEMS   Review of Systems   Constitutional: Negative for chills and fever.  "  HENT: Positive for hearing loss.    Respiratory: Negative for shortness of breath.    Cardiovascular: Negative for chest pain.   Gastrointestinal: Negative for nausea and vomiting.   Genitourinary: Positive for pelvic pain. Negative for hematuria.      Per HPI.     Patient Active Problem List   Diagnosis     Ulcerative colitis (H)     Weight gain     Pre-diabetes     Hip pain, left     Hearing loss     Fructose malabsorption      Past Medical History:   Diagnosis Date     Mumps      Spider veins      Ulcerative colitis (H)       Objective      PHYSICAL EXAM   /60   Ht 1.702 m (5' 7\")   Wt 87.1 kg (192 lb)   BMI 30.07 kg/m     Physical Exam  Constitutional:       Appearance: Normal appearance.   HENT:      Head: Normocephalic.      Nose: Nose normal.   Pulmonary:      Effort: Pulmonary effort is normal.   Genitourinary:     Comments: Normal intact perineal sensation bilaterally.  Evidence of atrophic vaginal tissue.  Normal-appearing internal and external labia.  No palpable urethral masses.  Hypermobility of the urethra appreciated with small dribbling of urine with cough.  Anterior grade 1 prolapse, apical grade 1, rectal well supported.  No evidence of pelvic floor tension myalgia.  Musculoskeletal:      Cervical back: Normal range of motion.   Neurological:      General: No focal deficit present.      Mental Status: She is alert and oriented to person, place, and time.   Psychiatric:         Mood and Affect: Mood normal.         Behavior: Behavior normal.       LABORATORY     Recent Labs   Lab Test 08/11/21  1637 08/11/21  1632   COLOR  --  Yellow   APPEARANCE  --  Clear   URINEGLC  --  Negative   URINEBILI  --  Negative   URINEKETONE  --  15 *   SG  --  1.015   UBLD  --  Moderate*   URINEPH  --  6.0   PROTEIN  --  Negative   UROBILINOGEN  --  0.2   NITRITE  --  Negative   LEUKEST  --  Negative   RBCU 1  --    WBCU 1  --        TESTING  PVR: 79 ml.    Assessment & Plan    1. Dysuria    2. Pelvic " fullness    3. Microhematuria    4. Atrophic vaginitis      I had the pleasure today of meeting with Ms. Childers to discuss her urinary symptomatology.  She has been treated several times with antibiotics and her symptoms have persisted.    Start estrogen cream a pea sized amount by the urethra and vaginal introitus at bedtime three times a week (Monday, Wednesday, and Friday).  If >$40, let me know and we can get via a compound pharmacy.    Sent urine for microscopy, Ureaplasma and Mycoplasma cultures.  If positive cultures, would treat with culture specific antibiotics.  If microscopic blood increased, would consider repeat of CT Urogram and cystoscopy.    If estrogen doesn't help discomfort, would consider possible pelvic floor PT in the future.    Send me a message on [employer] or call in to update with symptoms on estrogen.  Also, I will follow up on your urine results and next recommendations.    Call [phone].    We briefly discussed pessary, but I do not think we are at a point where this would be required at this time.  This is something to consider in the future which will often help with prolapse and a sense of heaviness in the pelvis.    Signed by:       Leonor Thompson PA-C

## 2021-08-13 ENCOUNTER — TRANSFERRED RECORDS (OUTPATIENT)
Dept: HEALTH INFORMATION MANAGEMENT | Facility: CLINIC | Age: 61
End: 2021-08-13

## 2021-08-19 ENCOUNTER — OFFICE VISIT (OUTPATIENT)
Dept: PEDIATRICS | Facility: CLINIC | Age: 61
End: 2021-08-19
Payer: COMMERCIAL

## 2021-08-19 VITALS
HEART RATE: 83 BPM | WEIGHT: 193 LBS | OXYGEN SATURATION: 96 % | DIASTOLIC BLOOD PRESSURE: 57 MMHG | TEMPERATURE: 98.2 F | BODY MASS INDEX: 30.29 KG/M2 | HEIGHT: 67 IN | SYSTOLIC BLOOD PRESSURE: 106 MMHG

## 2021-08-19 DIAGNOSIS — H91.93 BILATERAL HEARING LOSS, UNSPECIFIED HEARING LOSS TYPE: ICD-10-CM

## 2021-08-19 DIAGNOSIS — R73.03 PRE-DIABETES: ICD-10-CM

## 2021-08-19 DIAGNOSIS — Z00.00 ROUTINE GENERAL MEDICAL EXAMINATION AT A HEALTH CARE FACILITY: Primary | ICD-10-CM

## 2021-08-19 DIAGNOSIS — G62.9 PERIPHERAL POLYNEUROPATHY: ICD-10-CM

## 2021-08-19 DIAGNOSIS — K51.90 ULCERATIVE COLITIS WITHOUT COMPLICATIONS, UNSPECIFIED LOCATION (H): ICD-10-CM

## 2021-08-19 PROBLEM — E74.10 FRUCTOSE MALABSORPTION: Status: ACTIVE | Noted: 2018-02-19

## 2021-08-19 PROBLEM — H91.90 HEARING LOSS: Status: ACTIVE | Noted: 2019-09-20

## 2021-08-19 PROBLEM — M25.552 HIP PAIN, LEFT: Status: ACTIVE | Noted: 2018-02-19

## 2021-08-19 PROBLEM — R63.5 WEIGHT GAIN: Status: ACTIVE | Noted: 2019-10-04

## 2021-08-19 LAB
ALBUMIN SERPL-MCNC: 3.8 G/DL (ref 3.4–5)
ALP SERPL-CCNC: 51 U/L (ref 40–150)
ALT SERPL W P-5'-P-CCNC: 28 U/L (ref 0–50)
ANION GAP SERPL CALCULATED.3IONS-SCNC: 4 MMOL/L (ref 3–14)
AST SERPL W P-5'-P-CCNC: 22 U/L (ref 0–45)
BILIRUB SERPL-MCNC: 0.4 MG/DL (ref 0.2–1.3)
BUN SERPL-MCNC: 14 MG/DL (ref 7–30)
CALCIUM SERPL-MCNC: 9.5 MG/DL (ref 8.5–10.1)
CHLORIDE BLD-SCNC: 106 MMOL/L (ref 94–109)
CHOLEST SERPL-MCNC: 169 MG/DL
CO2 SERPL-SCNC: 28 MMOL/L (ref 20–32)
CREAT SERPL-MCNC: 0.94 MG/DL (ref 0.52–1.04)
FASTING STATUS PATIENT QL REPORTED: YES
FOLATE SERPL-MCNC: 49.6 NG/ML
GFR SERPL CREATININE-BSD FRML MDRD: 65 ML/MIN/1.73M2
GLUCOSE BLD-MCNC: 100 MG/DL (ref 70–99)
HBA1C MFR BLD: 5.3 % (ref 0–5.6)
HDLC SERPL-MCNC: 48 MG/DL
LDLC SERPL CALC-MCNC: 96 MG/DL
NONHDLC SERPL-MCNC: 121 MG/DL
POTASSIUM BLD-SCNC: 4.3 MMOL/L (ref 3.4–5.3)
PROT SERPL-MCNC: 7.8 G/DL (ref 6.8–8.8)
SODIUM SERPL-SCNC: 138 MMOL/L (ref 133–144)
TRIGL SERPL-MCNC: 125 MG/DL
VIT B12 SERPL-MCNC: 1026 PG/ML (ref 193–986)

## 2021-08-19 PROCEDURE — 36415 COLL VENOUS BLD VENIPUNCTURE: CPT | Performed by: INTERNAL MEDICINE

## 2021-08-19 PROCEDURE — 80061 LIPID PANEL: CPT | Performed by: INTERNAL MEDICINE

## 2021-08-19 PROCEDURE — 80053 COMPREHEN METABOLIC PANEL: CPT | Performed by: INTERNAL MEDICINE

## 2021-08-19 PROCEDURE — 83036 HEMOGLOBIN GLYCOSYLATED A1C: CPT | Performed by: INTERNAL MEDICINE

## 2021-08-19 PROCEDURE — 99396 PREV VISIT EST AGE 40-64: CPT | Performed by: INTERNAL MEDICINE

## 2021-08-19 PROCEDURE — 82607 VITAMIN B-12: CPT | Performed by: INTERNAL MEDICINE

## 2021-08-19 PROCEDURE — 82746 ASSAY OF FOLIC ACID SERUM: CPT | Performed by: INTERNAL MEDICINE

## 2021-08-19 ASSESSMENT — ENCOUNTER SYMPTOMS
HEMATOCHEZIA: 1
HEMATURIA: 1
DIARRHEA: 1

## 2021-08-19 ASSESSMENT — MIFFLIN-ST. JEOR: SCORE: 1468.07

## 2021-08-19 NOTE — PROGRESS NOTES
SUBJECTIVE:   CC: Sandy Childers is an 61 year old woman who presents for preventive health visit.       Patient has been advised of split billing requirements and indicates understanding: Yes  Healthy Habits:     Getting at least 3 servings of Calcium per day:  NO    Bi-annual eye exam:  Yes    Dental care twice a year:  Yes    Sleep apnea or symptoms of sleep apnea:  Sleep apnea    Diet:  Regular (no restrictions)    Frequency of exercise:  4-5 days/week    Duration of exercise:  15-30 minutes    Taking medications regularly:  Yes    Medication side effects:  None    PHQ-2 Total Score: 0    Additional concerns today:  Yes    Sandy is here for a preventive health visit.  Overall, she is doing well with the following concerns:      1. Ulcerative colitis - Long standing, symptomatic of diarrhea. Has been on many different treatments, most recently started stelara a month ago. Followed by [facility]  (Dr. Christianson).     2. Started using estradiol cream for vaginal dryness. Only 1 week in, too soon to tell a benefit.     3. Weight, wants to lose 50-60 lbs. Recognizes she needs to eat healthier. Has been putting together a meal plan and sticking to it, has been exercising most days (20 min). This is going well, she has already lost some weight.     4. Job stress. This has been increasing, becoming intolerable. Has applied for a new job. Also not far from nursing home.  Will make a change soon regardless.     Last colonoscopy - a few months ago.   Mammogram - last week at [facility] in [place].   Pap - can't remember. Likely [facility].     Has not had shingles vaccine.     Immunocompromised - wondering about covid booster.               Today's PHQ-2 Score:   PHQ-2 ( 1999 Pfizer) 8/19/2021   Q1: Little interest or pleasure in doing things 0   Q2: Feeling down, depressed or hopeless 0   PHQ-2 Score 0   Q1: Little interest or pleasure in doing things Not at all   Q2: Feeling down, depressed or hopeless Not at all   PHQ-2 Score 0  "      Abuse: Current or Past (Physical, Sexual or Emotional) - No  Do you feel safe in your environment? Yes    Have you ever done Advance Care Planning? (For example, a Health Directive, POLST, or a discussion with a medical provider or your loved ones about your wishes): Yes, patient states has an Advance Care Planning document and will bring a copy to the clinic.    Social History     Tobacco Use     Smoking status: Never Smoker     Smokeless tobacco: Never Used   Substance Use Topics     Alcohol use: Yes     If you drink alcohol do you typically have >3 drinks per day or >7 drinks per week? No    Alcohol Use 8/19/2021   Prescreen: >3 drinks/day or >7 drinks/week? No   Prescreen: >3 drinks/day or >7 drinks/week? -       Reviewed orders with patient.  Reviewed health maintenance and updated orders accordingly - Yes      Breast Cancer Screening:  Any new diagnosis of family breast, ovarian, or bowel cancer? No    FHS-7: No flowsheet data found.    Mammogram Screening: Recommended mammography every 1-2 years with patient discussion and risk factor consideration  Pertinent mammograms are reviewed under the imaging tab.    History of abnormal Pap smear: NO - age 30-65 PAP every 5 years with negative HPV co-testing recommended     Reviewed and updated as needed this visit by clinical staff  Tobacco  Allergies  Meds  Problems  Med Hx  Surg Hx  Fam Hx          Reviewed and updated as needed this visit by Provider  Tobacco  Allergies  Meds  Problems  Med Hx  Surg Hx  Fam Hx             Review of Systems   HENT: Positive for hearing loss.    Gastrointestinal: Positive for diarrhea and hematochezia.   Genitourinary: Positive for hematuria.          OBJECTIVE:   /57 (BP Location: Right arm, Cuff Size: Adult Regular)   Pulse 83   Temp 98.2  F (36.8  C) (Tympanic)   Ht 1.702 m (5' 7\")   Wt 87.5 kg (193 lb)   SpO2 96%   BMI 30.23 kg/m    Physical Exam  GENERAL: healthy, alert and no distress  EYES: " Eyes grossly normal to inspection, PERRL and conjunctivae and sclerae normal  HENT: ear canals and TM's normal, nose and mouth without ulcers or lesions  NECK: no adenopathy, no asymmetry, masses, or scars and thyroid normal to palpation  RESP: lungs clear to auscultation - no rales, rhonchi or wheezes  CV: regular rate and rhythm, normal S1 S2, no S3 or S4, no murmur, click or rub, no peripheral edema and peripheral pulses strong  MS: no gross musculoskeletal defects noted, no edema  SKIN: no suspicious lesions or rashes  NEURO: Normal strength and tone, mentation intact and speech normal  PSYCH: mentation appears normal, affect normal/bright    Diagnostic Test Results:  Labs reviewed in Epic    ASSESSMENT/PLAN:   (Z00.00) Routine general medical examination at a health care facility  (primary encounter diagnosis)  Comment: Doing well, working on weight loss and stress reduction.   Plan: Lipid panel reflex to direct LDL Fasting,         Comprehensive metabolic panel (BMP + Alb, Alk         Phos, ALT, AST, Total. Bili, TP)            (R73.03) Pre-diabetes  Comment: Due for Hgb A1c check.   Plan: Hemoglobin A1c, CANCELED: GLUCOSE            (K51.90) Ulcerative colitis without complications, unspecified location (H)  Comment: Followed by [facility].  Currently on Stelara      (91.93) Bilateral hearing loss, unspecified hearing loss type  Comment: Wears hearing aids  Plan: Continue to monitor    (G62.9) Peripheral polyneuropathy  Comment: Present for years, improving with some weight loss. Suspects may be due to overweight, but has not had B12, folate levels checked.   Plan: Vitamin B12, Folate              Patient has been advised of split billing requirements and indicates understanding: Yes  COUNSELING:  Reviewed preventive health counseling, as reflected in patient instructions       Regular exercise       Healthy diet/nutrition       Immunizations    Recommend shingles vaccine            (Taryn)menopause  "management    Estimated body mass index is 30.23 kg/m  as calculated from the following:    Height as of this encounter: 1.702 m (5' 7\").    Weight as of this encounter: 87.5 kg (193 lb).    Weight management plan: Discussed healthy diet and exercise guidelines    She reports that she has never smoked. She has never used smokeless tobacco.      Counseling Resources:  ATP IV Guidelines  Pooled Cohorts Equation Calculator  Breast Cancer Risk Calculator  BRCA-Related Cancer Risk Assessment: FHS-7 Tool  FRAX Risk Assessment  ICSI Preventive Guidelines  Dietary Guidelines for Americans, 2010  USDA's MyPlate  ASA Prophylaxis  Lung CA Screening    Kimberlyn Bustamante MD  [facility] HELEN  "

## 2021-08-19 NOTE — LETTER
August 20, 2021      Sandy Childers  [address] [facility] [id number]          Dear Sandy,     Here are your lab results. A couple of things:     1. Your vitamin B12 level is slightly elevated.  It's not a problem for it to be a bit high, but it certainly means that you do not have low levels causing your foot tingling/numbness.  If you take a supplement, you can stop taking it.     2. Your cholesterol test looks great.     3. Your fasting blood sugar and kidney, electrolyte, and liver tests were normal.       4. Your hemoglobin A1c is 5.3%, in the normal range. You do not have diabetes or pre-diabetes.     Please feel free to contact us with any questions or concerns!   Sincerely,   Kimberlyn Bustamante MD   Internal Medicine - Pediatrics       Resulted Orders   Vitamin B12   Result Value Ref Range    Vitamin B12 1,026 (H) 193 - 986 pg/mL   Folate   Result Value Ref Range    Folic Acid 49.6 >=5.4 ng/mL      Comment:      Deficient: <3.4 ng/mL  Indeterminate: 3.4-5.4 ng/mL  Normal: > 5.4 ng/mL   Hemoglobin A1c   Result Value Ref Range    Hemoglobin A1C 5.3 0.0 - 5.6 %      Comment:      Normal <5.7%   Prediabetes 5.7-6.4%    Diabetes 6.5% or higher     Note: Adopted from ADA consensus guidelines.   Lipid panel reflex to direct LDL Fasting   Result Value Ref Range    Cholesterol 169 <200 mg/dL      Comment:      Age 0-19 years  Desirable: <170 mg/dL  Borderline high:  170-199 mg/dl  High:            >199 mg/dl    Age 20 years and older  Desirable: <200 mg/dL    Triglycerides 125 <150 mg/dL      Comment:      0-9 years:  Normal:    Less than 75 mg/dL  Borderline high:  75-99 mg/dL  High:             Greater than or equal to 100 mg/dL    0-19 years:  Normal:    Less than 90 mg/dL  Borderline high:   mg/dL  High:             Greater than or equal to 130 mg/dL    20 years and older:  Normal:    Less than 150 mg/dL  Borderline high:  150-199 mg/dL  High:             200-499 mg/dL  Very high:   Greater than or equal  to 500 mg/dL    Direct Measure HDL 48 (L) >=50 mg/dL      Comment:      0-19 years:       Greater than or equal to 45 mg/dL   Low: Less than 40 mg/dL   Borderline low: 40-44 mg/dL     20 years and older:   Female: Greater than or equal to 50 mg/dL   Male:   Greater than or equal to 40 mg/dL         LDL Cholesterol Calculated 96 <=100 mg/dL      Comment:      Age 0-19 years:  Desirable: 0-110 mg/dL   Borderline high: 110-129 mg/dL   High: >= 130 mg/dL    Age 20 years and older:  Desirable: <100mg/dL  Above desirable: 100-129 mg/dL   Borderline high: 130-159 mg/dL   High: 160-189 mg/dL   Very high: >= 190 mg/dL    Non HDL Cholesterol 121 <130 mg/dL      Comment:      0-19 years:  Desirable:          Less than 120 mg/dL  Borderline high:   120-144 mg/dL  High:                   Greater than or equal to 145 mg/dL    20 years and older:  Desirable:          130 mg/dL  Above Desirable: 130-159 mg/dL  Borderline high:   160-189 mg/dL  High:               190-219 mg/dL  Very high:     Greater than or equal to 220 mg/dL    Patient Fasting > 8hrs? Yes    Comprehensive metabolic panel (BMP + Alb, Alk Phos, ALT, AST, Total. Bili, TP)   Result Value Ref Range    Sodium 138 133 - 144 mmol/L    Potassium 4.3 3.4 - 5.3 mmol/L    Chloride 106 94 - 109 mmol/L    Carbon Dioxide (CO2) 28 20 - 32 mmol/L    Anion Gap 4 3 - 14 mmol/L    Urea Nitrogen 14 7 - 30 mg/dL    Creatinine 0.94 0.52 - 1.04 mg/dL    Calcium 9.5 8.5 - 10.1 mg/dL    Glucose 100 (H) 70 - 99 mg/dL    Alkaline Phosphatase 51 40 - 150 U/L    AST 22 0 - 45 U/L    ALT 28 0 - 50 U/L    Protein Total 7.8 6.8 - 8.8 g/dL    Albumin 3.8 3.4 - 5.0 g/dL    Bilirubin Total 0.4 0.2 - 1.3 mg/dL    GFR Estimate 65 >60 mL/min/1.73m2      Comment:      As of July 11, 2021, eGFR is calculated by the CKD-EPI creatinine equation, without race adjustment. eGFR can be influenced by muscle mass, exercise, and diet. The reported eGFR is an estimation only and is only applicable if the renal  function is stable.

## 2021-08-20 LAB — BACTERIA UR CULT: NORMAL

## 2021-08-24 ASSESSMENT — ENCOUNTER SYMPTOMS
FEVER: 0
SHORTNESS OF BREATH: 0
HEMATURIA: 0
VOMITING: 0
NAUSEA: 0
CHILLS: 0

## 2021-08-24 NOTE — PROGRESS NOTES
Subjective      CHIEF COMPLAINT/REASON FOR VISIT   Pelvic discomfort/fullness/pinching    HISTORY OF PRESENT ILLNESS   Ms. Childers is a pleasant 61-year-old, , female who feels a burning/pinching and heavy sensation in the pelvis despite treatment for urinary tract infections.  Patient denies symptoms of prolapse.  She does note that she gets nausea and vomiting with her colitis, but denies these currently.  She was treated for urinary tract infection on May 12 with Macrobid and May 17 with Keflex.  They did not improve her symptomatology.  Patient has been on several antibiotics and she is still had the symptoms for several months.  [facility] and [facility] with a urinary tract infection.     She has been previously seen for microscopic hematuria and had a normal cystoscopy in 2019 with Dr. Birmingham.  Urinalysis have not been convincing for infection.  Patient notes some occasional urge incontinence with rare urgency.  She does note some bowel issues.  She has been evaluated prior to the visit with Dr. Birmingham for microscopic hematuria.    They had previously discussed possible trial of topical estrogen to see if this would help her symptomatology.    She does note that she spends a lot of time in the bathroom due to her colitis.  She has had blood in her stool.    The following portions of the patient's history were reviewed and updated as appropriate: allergies, current medications, past family history, past medical history, past social history, past surgical history, and problem list.     REVIEW OF SYSTEMS   Review of Systems   Constitutional: Negative for chills and fever.   HENT: Positive for hearing loss.    Respiratory: Negative for shortness of breath.    Cardiovascular: Negative for chest pain.   Gastrointestinal: Negative for nausea and vomiting.   Genitourinary: Positive for pelvic pain. Negative for hematuria.      Per HPI.     Patient Active Problem List   Diagnosis  "    Ulcerative colitis (H)     Weight gain     Pre-diabetes     Hip pain, left     Hearing loss     Fructose malabsorption      Past Medical History:   Diagnosis Date     Mumps      Spider veins      Ulcerative colitis (H)       Objective      PHYSICAL EXAM   /60   Ht 1.702 m (5' 7\")   Wt 87.1 kg (192 lb)   BMI 30.07 kg/m     Physical Exam  Constitutional:       Appearance: Normal appearance.   HENT:      Head: Normocephalic.      Nose: Nose normal.   Pulmonary:      Effort: Pulmonary effort is normal.   Genitourinary:     Comments: Normal intact perineal sensation bilaterally.  Evidence of atrophic vaginal tissue.  Normal-appearing internal and external labia.  No palpable urethral masses.  Hypermobility of the urethra appreciated with small dribbling of urine with cough.  Anterior grade 1 prolapse, apical grade 1, rectal well supported.  No evidence of pelvic floor tension myalgia.  Musculoskeletal:      Cervical back: Normal range of motion.   Neurological:      General: No focal deficit present.      Mental Status: She is alert and oriented to person, place, and time.   Psychiatric:         Mood and Affect: Mood normal.         Behavior: Behavior normal.       LABORATORY     Recent Labs   Lab Test 08/11/21  1637 08/11/21  1632   COLOR  --  Yellow   APPEARANCE  --  Clear   URINEGLC  --  Negative   URINEBILI  --  Negative   URINEKETONE  --  15 *   SG  --  1.015   UBLD  --  Moderate*   URINEPH  --  6.0   PROTEIN  --  Negative   UROBILINOGEN  --  0.2   NITRITE  --  Negative   LEUKEST  --  Negative   RBCU 1  --    WBCU 1  --        TESTING  PVR: 79 ml.    Assessment & Plan    1. Dysuria    2. Pelvic fullness    3. Microhematuria    4. Atrophic vaginitis      I had the pleasure today of meeting with Ms. Childers to discuss her urinary symptomatology.  She has been treated several times with antibiotics and her symptoms have persisted.    Start estrogen cream a pea sized amount by the urethra and vaginal introitus " at bedtime three times a week (Monday, Wednesday, and Friday).  If >$40, let me know and we can get via a compound pharmacy.    Sent urine for microscopy, Ureaplasma and Mycoplasma cultures.  If positive cultures, would treat with culture specific antibiotics.  If microscopic blood increased, would consider repeat of CT Urogram and cystoscopy.    If estrogen doesn't help discomfort, would consider possible pelvic floor PT in the future.    Send me a message on [employer]Net or call in to update with symptoms on estrogen.  Also, I will follow up on your urine results and next recommendations.    Call [phone].    We briefly discussed pessary, but I do not think we are at a point where this would be required at this time.  This is something to consider in the future which will often help with prolapse and a sense of heaviness in the pelvis.    Signed by:       Leonor Thompson PA-C

## 2021-09-24 ENCOUNTER — TRANSFERRED RECORDS (OUTPATIENT)
Dept: HEALTH INFORMATION MANAGEMENT | Facility: CLINIC | Age: 61
End: 2021-09-24

## 2021-10-30 ENCOUNTER — HEALTH MAINTENANCE LETTER (OUTPATIENT)
Age: 61
End: 2021-10-30

## 2021-11-01 ENCOUNTER — TRANSFERRED RECORDS (OUTPATIENT)
Dept: HEALTH INFORMATION MANAGEMENT | Facility: CLINIC | Age: 61
End: 2021-11-01
Payer: COMMERCIAL

## 2021-11-01 LAB
ALT SERPL-CCNC: 19 LU/L (ref 0–32)
AST SERPL-CCNC: 19 LU/L (ref 0–40)

## 2022-01-31 ENCOUNTER — TRANSFERRED RECORDS (OUTPATIENT)
Dept: HEALTH INFORMATION MANAGEMENT | Facility: CLINIC | Age: 62
End: 2022-01-31
Payer: COMMERCIAL

## 2022-01-31 LAB
ALT SERPL-CCNC: 18 IU/L (ref 0–32)
AST SERPL-CCNC: 20 IU/L (ref 0–40)

## 2022-04-07 ENCOUNTER — TRANSFERRED RECORDS (OUTPATIENT)
Dept: HEALTH INFORMATION MANAGEMENT | Facility: CLINIC | Age: 62
End: 2022-04-07
Payer: COMMERCIAL

## 2022-04-07 LAB
ALT SERPL-CCNC: 21 IU/L (ref 0–32)
AST SERPL-CCNC: 25 IU/L (ref 0–40)

## 2022-04-29 ENCOUNTER — TRANSFERRED RECORDS (OUTPATIENT)
Dept: HEALTH INFORMATION MANAGEMENT | Facility: CLINIC | Age: 62
End: 2022-04-29
Payer: COMMERCIAL

## 2022-05-06 ENCOUNTER — IMMUNIZATION (OUTPATIENT)
Dept: NURSING | Facility: CLINIC | Age: 62
End: 2022-05-06
Payer: COMMERCIAL

## 2022-05-06 PROCEDURE — 0064A COVID-19,PF,MODERNA (18+ YRS BOOSTER .25ML): CPT

## 2022-05-06 PROCEDURE — 91306 COVID-19,PF,MODERNA (18+ YRS BOOSTER .25ML): CPT

## 2022-06-22 ENCOUNTER — TRANSFERRED RECORDS (OUTPATIENT)
Dept: HEALTH INFORMATION MANAGEMENT | Facility: CLINIC | Age: 62
End: 2022-06-22

## 2022-06-22 LAB
ALT SERPL-CCNC: 21 IU/L (ref 0–32)
AST SERPL-CCNC: 22 IU/L (ref 0–40)

## 2022-10-22 ENCOUNTER — HEALTH MAINTENANCE LETTER (OUTPATIENT)
Age: 62
End: 2022-10-22

## 2023-11-05 ENCOUNTER — HEALTH MAINTENANCE LETTER (OUTPATIENT)
Age: 63
End: 2023-11-05

## 2024-06-02 ENCOUNTER — HEALTH MAINTENANCE LETTER (OUTPATIENT)
Age: 64
End: 2024-06-02

## 2024-08-22 ENCOUNTER — ANCILLARY PROCEDURE (OUTPATIENT)
Dept: BONE DENSITY | Facility: CLINIC | Age: 64
End: 2024-08-22
Payer: COMMERCIAL

## 2024-08-22 DIAGNOSIS — Z78.0 ASYMPTOMATIC POSTMENOPAUSAL STATUS: ICD-10-CM

## 2024-08-22 PROCEDURE — 77080 DXA BONE DENSITY AXIAL: CPT | Mod: TC | Performed by: PHYSICIAN ASSISTANT

## 2025-06-14 ENCOUNTER — HEALTH MAINTENANCE LETTER (OUTPATIENT)
Age: 65
End: 2025-06-14

## 2025-08-16 ENCOUNTER — HEALTH MAINTENANCE LETTER (OUTPATIENT)
Age: 65
End: 2025-08-16